# Patient Record
Sex: MALE | Race: OTHER | NOT HISPANIC OR LATINO | URBAN - METROPOLITAN AREA
[De-identification: names, ages, dates, MRNs, and addresses within clinical notes are randomized per-mention and may not be internally consistent; named-entity substitution may affect disease eponyms.]

---

## 2019-04-03 ENCOUNTER — OFFICE VISIT (OUTPATIENT)
Dept: PHYSICAL THERAPY | Facility: CLINIC | Age: 64
End: 2019-04-03
Payer: MEDICARE

## 2019-04-03 DIAGNOSIS — M48.061 SPINAL STENOSIS OF LUMBAR REGION WITHOUT NEUROGENIC CLAUDICATION: Primary | ICD-10-CM

## 2019-04-03 PROCEDURE — 97110 THERAPEUTIC EXERCISES: CPT | Performed by: PHYSICAL THERAPIST

## 2019-04-03 PROCEDURE — 97112 NEUROMUSCULAR REEDUCATION: CPT | Performed by: PHYSICAL THERAPIST

## 2019-04-04 ENCOUNTER — OFFICE VISIT (OUTPATIENT)
Dept: PHYSICAL THERAPY | Facility: CLINIC | Age: 64
End: 2019-04-04
Payer: MEDICARE

## 2019-04-04 DIAGNOSIS — M48.061 SPINAL STENOSIS OF LUMBAR REGION WITHOUT NEUROGENIC CLAUDICATION: Primary | ICD-10-CM

## 2019-04-04 PROCEDURE — 97110 THERAPEUTIC EXERCISES: CPT | Performed by: PHYSICAL THERAPIST

## 2019-04-10 ENCOUNTER — TRANSCRIBE ORDERS (OUTPATIENT)
Dept: PHYSICAL THERAPY | Facility: CLINIC | Age: 64
End: 2019-04-10

## 2019-04-10 ENCOUNTER — OFFICE VISIT (OUTPATIENT)
Dept: PHYSICAL THERAPY | Facility: CLINIC | Age: 64
End: 2019-04-10
Payer: MEDICARE

## 2019-04-10 DIAGNOSIS — M48.061 SPINAL STENOSIS OF LUMBAR REGION WITHOUT NEUROGENIC CLAUDICATION: Primary | ICD-10-CM

## 2019-04-10 PROCEDURE — 97112 NEUROMUSCULAR REEDUCATION: CPT | Performed by: PHYSICAL THERAPIST

## 2019-04-10 PROCEDURE — 97110 THERAPEUTIC EXERCISES: CPT | Performed by: PHYSICAL THERAPIST

## 2019-04-11 ENCOUNTER — OFFICE VISIT (OUTPATIENT)
Dept: PHYSICAL THERAPY | Facility: CLINIC | Age: 64
End: 2019-04-11
Payer: MEDICARE

## 2019-04-11 DIAGNOSIS — M48.061 SPINAL STENOSIS OF LUMBAR REGION WITHOUT NEUROGENIC CLAUDICATION: Primary | ICD-10-CM

## 2019-04-11 PROCEDURE — 97112 NEUROMUSCULAR REEDUCATION: CPT

## 2019-04-11 PROCEDURE — 97110 THERAPEUTIC EXERCISES: CPT

## 2019-04-16 ENCOUNTER — APPOINTMENT (OUTPATIENT)
Dept: PHYSICAL THERAPY | Facility: CLINIC | Age: 64
End: 2019-04-16
Payer: MEDICARE

## 2019-04-23 ENCOUNTER — APPOINTMENT (OUTPATIENT)
Dept: PHYSICAL THERAPY | Facility: CLINIC | Age: 64
End: 2019-04-23
Payer: MEDICARE

## 2019-04-25 ENCOUNTER — APPOINTMENT (OUTPATIENT)
Dept: PHYSICAL THERAPY | Facility: CLINIC | Age: 64
End: 2019-04-25
Payer: MEDICARE

## 2019-05-07 ENCOUNTER — OFFICE VISIT (OUTPATIENT)
Dept: PHYSICAL THERAPY | Facility: CLINIC | Age: 64
End: 2019-05-07
Payer: MEDICARE

## 2019-05-07 DIAGNOSIS — M48.061 SPINAL STENOSIS OF LUMBAR REGION WITHOUT NEUROGENIC CLAUDICATION: Primary | ICD-10-CM

## 2019-05-07 PROCEDURE — 97112 NEUROMUSCULAR REEDUCATION: CPT | Performed by: PHYSICAL THERAPIST

## 2019-05-07 PROCEDURE — 97110 THERAPEUTIC EXERCISES: CPT | Performed by: PHYSICAL THERAPIST

## 2019-05-09 ENCOUNTER — OFFICE VISIT (OUTPATIENT)
Dept: PHYSICAL THERAPY | Facility: CLINIC | Age: 64
End: 2019-05-09
Payer: MEDICARE

## 2019-05-09 DIAGNOSIS — M48.061 SPINAL STENOSIS OF LUMBAR REGION WITHOUT NEUROGENIC CLAUDICATION: Primary | ICD-10-CM

## 2019-05-09 PROCEDURE — 97110 THERAPEUTIC EXERCISES: CPT

## 2019-05-14 ENCOUNTER — APPOINTMENT (OUTPATIENT)
Dept: PHYSICAL THERAPY | Facility: CLINIC | Age: 64
End: 2019-05-14
Payer: MEDICARE

## 2019-05-16 ENCOUNTER — APPOINTMENT (OUTPATIENT)
Dept: PHYSICAL THERAPY | Facility: CLINIC | Age: 64
End: 2019-05-16
Payer: MEDICARE

## 2019-09-26 ENCOUNTER — LAB REQUISITION (OUTPATIENT)
Dept: LAB | Facility: HOSPITAL | Age: 64
End: 2019-09-26
Payer: MEDICARE

## 2019-09-26 DIAGNOSIS — E78.2 MIXED HYPERLIPIDEMIA: ICD-10-CM

## 2019-09-26 LAB
ALBUMIN SERPL BCP-MCNC: 3.7 G/DL (ref 3.5–5)
ALP SERPL-CCNC: 90 U/L (ref 46–116)
ALT SERPL W P-5'-P-CCNC: 12 U/L (ref 12–78)
ANION GAP SERPL CALCULATED.3IONS-SCNC: 9 MMOL/L (ref 4–13)
AST SERPL W P-5'-P-CCNC: 12 U/L (ref 5–45)
BASOPHILS # BLD AUTO: 0.02 THOUSANDS/ΜL (ref 0–0.1)
BASOPHILS NFR BLD AUTO: 1 % (ref 0–1)
BILIRUB SERPL-MCNC: 1 MG/DL (ref 0.2–1)
BUN SERPL-MCNC: 14 MG/DL (ref 5–25)
CALCIUM SERPL-MCNC: 9.2 MG/DL (ref 8.3–10.1)
CHLORIDE SERPL-SCNC: 103 MMOL/L (ref 100–108)
CHOLEST SERPL-MCNC: 177 MG/DL (ref 50–200)
CO2 SERPL-SCNC: 29 MMOL/L (ref 21–32)
CREAT SERPL-MCNC: 1.23 MG/DL (ref 0.6–1.3)
EOSINOPHIL # BLD AUTO: 0.27 THOUSAND/ΜL (ref 0–0.61)
EOSINOPHIL NFR BLD AUTO: 10 % (ref 0–6)
ERYTHROCYTE [DISTWIDTH] IN BLOOD BY AUTOMATED COUNT: 14.5 % (ref 11.6–15.1)
GFR SERPL CREATININE-BSD FRML MDRD: 62 ML/MIN/1.73SQ M
GLUCOSE P FAST SERPL-MCNC: 85 MG/DL (ref 65–99)
HCT VFR BLD AUTO: 34.6 % (ref 36.5–49.3)
HDLC SERPL-MCNC: 66 MG/DL (ref 40–60)
HGB BLD-MCNC: 11.4 G/DL (ref 12–17)
IMM GRANULOCYTES # BLD AUTO: 0.01 THOUSAND/UL (ref 0–0.2)
IMM GRANULOCYTES NFR BLD AUTO: 0 % (ref 0–2)
LDLC SERPL CALC-MCNC: 102 MG/DL (ref 0–100)
LYMPHOCYTES # BLD AUTO: 0.33 THOUSANDS/ΜL (ref 0.6–4.47)
LYMPHOCYTES NFR BLD AUTO: 13 % (ref 14–44)
MCH RBC QN AUTO: 30.2 PG (ref 26.8–34.3)
MCHC RBC AUTO-ENTMCNC: 32.9 G/DL (ref 31.4–37.4)
MCV RBC AUTO: 92 FL (ref 82–98)
MONOCYTES # BLD AUTO: 0.49 THOUSAND/ΜL (ref 0.17–1.22)
MONOCYTES NFR BLD AUTO: 19 % (ref 4–12)
NEUTROPHILS # BLD AUTO: 1.51 THOUSANDS/ΜL (ref 1.85–7.62)
NEUTS SEG NFR BLD AUTO: 57 % (ref 43–75)
NONHDLC SERPL-MCNC: 111 MG/DL
NRBC BLD AUTO-RTO: 0 /100 WBCS
PLATELET # BLD AUTO: 166 THOUSANDS/UL (ref 149–390)
PMV BLD AUTO: 10.1 FL (ref 8.9–12.7)
POTASSIUM SERPL-SCNC: 4.1 MMOL/L (ref 3.5–5.3)
PROT SERPL-MCNC: 7.6 G/DL (ref 6.4–8.2)
RBC # BLD AUTO: 3.78 MILLION/UL (ref 3.88–5.62)
SODIUM SERPL-SCNC: 141 MMOL/L (ref 136–145)
TRIGL SERPL-MCNC: 47 MG/DL
WBC # BLD AUTO: 2.63 THOUSAND/UL (ref 4.31–10.16)

## 2019-09-26 PROCEDURE — 80053 COMPREHEN METABOLIC PANEL: CPT | Performed by: INTERNAL MEDICINE

## 2019-09-26 PROCEDURE — 80061 LIPID PANEL: CPT | Performed by: INTERNAL MEDICINE

## 2019-09-26 PROCEDURE — 85025 COMPLETE CBC W/AUTO DIFF WBC: CPT | Performed by: INTERNAL MEDICINE

## 2019-10-03 ENCOUNTER — LAB REQUISITION (OUTPATIENT)
Dept: LAB | Facility: HOSPITAL | Age: 64
End: 2019-10-03
Payer: MEDICARE

## 2019-10-03 DIAGNOSIS — E78.2 MIXED HYPERLIPIDEMIA: ICD-10-CM

## 2019-10-03 LAB
BACTERIA UR QL AUTO: ABNORMAL /HPF
BILIRUB UR QL STRIP: NEGATIVE
CLARITY UR: CLEAR
COLOR UR: YELLOW
GLUCOSE UR STRIP-MCNC: NEGATIVE MG/DL
HGB UR QL STRIP.AUTO: ABNORMAL
KETONES UR STRIP-MCNC: NEGATIVE MG/DL
LEUKOCYTE ESTERASE UR QL STRIP: NEGATIVE
MUCOUS THREADS UR QL AUTO: ABNORMAL
NITRITE UR QL STRIP: NEGATIVE
NON-SQ EPI CELLS URNS QL MICRO: ABNORMAL /HPF
PH UR STRIP.AUTO: 5.5 [PH]
PROT UR STRIP-MCNC: NEGATIVE MG/DL
RBC #/AREA URNS AUTO: ABNORMAL /HPF
SP GR UR STRIP.AUTO: 1.02 (ref 1–1.03)
UROBILINOGEN UR QL STRIP.AUTO: 0.2 E.U./DL
WBC #/AREA URNS AUTO: ABNORMAL /HPF

## 2019-10-03 PROCEDURE — 81001 URINALYSIS AUTO W/SCOPE: CPT | Performed by: INTERNAL MEDICINE

## 2020-01-02 ENCOUNTER — TRANSCRIBE ORDERS (OUTPATIENT)
Dept: PHYSICAL THERAPY | Facility: CLINIC | Age: 65
End: 2020-01-02

## 2020-01-02 ENCOUNTER — EVALUATION (OUTPATIENT)
Dept: PHYSICAL THERAPY | Facility: CLINIC | Age: 65
End: 2020-01-02
Payer: MEDICARE

## 2020-01-02 DIAGNOSIS — I89.0 LYMPHEDEMA: Primary | ICD-10-CM

## 2020-01-02 PROCEDURE — 97140 MANUAL THERAPY 1/> REGIONS: CPT | Performed by: PHYSICAL THERAPIST

## 2020-01-02 PROCEDURE — 97162 PT EVAL MOD COMPLEX 30 MIN: CPT | Performed by: PHYSICAL THERAPIST

## 2020-01-02 NOTE — PROGRESS NOTES
PT Evaluation     Today's date: 2020  Patient name: Morteza Rebolledo  : 1955  MRN: [id number]  Referring provider: Naseem Corrales MD  Dx:   Encounter Diagnosis     ICD-10-CM    1  Lymphedema I89 0                   Assessment  Assessment details: Alida Brewer reports to PT with b/l LE lymphedema and generalized weakness  Results of eval indicate LE weakness greater on L side, impaired gait, limited ankle and knee ROM related to edema, positive Stemmer's sign b/l, greater edema on LLE  Pt presents as stage II lymphedema  These impairments are resulting in difficulty with household ambulation, transfers, and negotiating stairs  He would benefit from skilled PT to address these deficits  He would also benefit from compression stockings above knees  Was educated on continuation of lymphemia pump and home exercises  Barriers to therapy: Complex medical hx, CVA  Understanding of Dx/Px/POC: good   Prognosis: fair    Goals  STGs (4 weeks)  Pt will be independent with comprehensive HEP   Decrease edema by 10%  Pt will be independent with garments and lymphedema pump    LTG's (to be achieved by d/c)  Pt will be able to self manage sx's independently   Decrease edema by 25%  Decrease girth measurement by 1-2 cm   Pt will be able to ambulate at least 50ft safely with least restrictive AD, not holding onto furniture   Pt will be able to transfer from chair with safe technique and single or no UE support      Plan  Plan details: Initiate POC   Splite session time 50/50 for lymphedema treatment and strengthening  Patient would benefit from: skilled physical therapy  Planned therapy interventions: manual therapy, balance, home exercise program, gait training, therapeutic exercise, therapeutic activities, strengthening, patient education and neuromuscular re-education  Frequency: 2x week  Duration in weeks: 8  Plan of Care beginning date: 2020  Plan of Care expiration date: 3/10/2020  Treatment plan discussed with: patient        Subjective Evaluation    History of Present Illness  Mechanism of injury: Corrina Burgess reports to PT with b/l lymphedema (worse on L) and general weakness  Has had lymphedema for a few years now on and off which started on his left LE then to his R  Has been getting weaker because of frequent surgical history and medical issues related to hernia and prostate cancer  Caner in remission now  Still has issues with hernia on L side  Uses compression stockings and a lymphadema pump (states these have been helping less and less over time)  States his legs swell badly after a few hours of being up  Has hx of CVA which caused L sided weakness  Difficulty with walking, states he can only go a few feet before needing to rest, holds onto walls and furniture  Uses a SPC  Difficulty with mobility around his home and in community  Difficulty with stairs  Goals: improve strength, manage lymphadema  Med hx: hernia, HTN, skin burning from hx of shingles, obesity, CVA, hx of cancer    Medications: pt remembers he is taking medication for HTN, gabapentin, oxycotin  Pain  No pain reported          Objective    ROM:  R Knee flex: 115  L knee flex: 105    R ankle DF: 4  L ankle DF: 2    Strength:   Hip flex R: 4  Hip flex L: 5  Hip abd seated: 3+ b/l  Knee ext R: 5  Knee flex L: 3  Ankle DF R: 4  Ankle DF L: 3     Girth measurements: see sheet for details    Stemmer's sign: positive    Lymph stage: stage 2     *Pitting edema present through out b/l LE's  Transfers: needs b/l UE's to rise from chair with difficulty, demonstrates poor safety due to instability    Gait Analysis: Demonstrates wide Odalis, instability, reaches for furniture          Precautions: Prostate cancer, Hx of CVA, hx of cancer,        Manual  1/2            Girth measurements  Taken by PTA under PT interpretation                                                                     Exercise Diary              Bike              Heel / toe raises              Mini squats in bars             Step ups             Static balance              LE circuit              Sit to stands                           Lymphedema treatment                                                                                                                                                                Modalities

## 2020-01-02 NOTE — LETTER
2020    Ajit Sarabia MD  [address] [place] [address]    Patient: Jalil Quiroga   YOB: 1955   Date of Visit: 2020     Encounter Diagnosis     ICD-10-CM    1  Lymphedema I89 0        Dear Dr Rivera Clarity: Thank you for your recent referral of Jalil Quiroga  Please review the attached evaluation summary from Usman's recent visit  Please verify that you agree with the plan of care by signing the attached order  If you have any questions or concerns, please do not hesitate to call  I sincerely appreciate the opportunity to share in the care of one of your patients and hope to have another opportunity to work with you in the near future  Sincerely,    Ne Morales, PT      Referring Provider:      I certify that I have read the below Plan of Care and certify the need for these services furnished under this plan of treatment while under my care  Ajit Sarabia MD  [address] [place] [address]: [phone]          PT Evaluation     Today's date: 2020  Patient name: Jalil Quiroga  : 1955  MRN: [id number]  Referring provider: Nya Garcia MD  Dx:   Encounter Diagnosis     ICD-10-CM    1  Lymphedema I89 0                   Assessment  Assessment details: Helen Ruggiero reports to PT with b/l LE lymphedema and generalized weakness  Results of eval indicate LE weakness greater on L side, impaired gait, limited ankle and knee ROM related to edema, positive Stemmer's sign b/l, greater edema on LLE  Pt presents as stage II lymphedema  These impairments are resulting in difficulty with household ambulation, transfers, and negotiating stairs  He would benefit from skilled PT to address these deficits  He would also benefit from compression stockings above knees  Was educated on continuation of lymphemia pump and home exercises               Barriers to therapy: Complex medical hx, CVA  Understanding of Dx/Px/POC: good   Prognosis: fair    Goals  STGs (4 weeks)  Pt will be independent with comprehensive HEP   Decrease edema by 10%  Pt will be independent with garments and lymphedema pump    LTG's (to be achieved by d/c)  Pt will be able to self manage sx's independently   Decrease edema by 25%  Decrease girth measurement by 1-2 cm   Pt will be able to ambulate at least 50ft safely with least restrictive AD, not holding onto furniture   Pt will be able to transfer from chair with safe technique and single or no UE support      Plan  Plan details: Initiate POC  Splite session time 50/50 for lymphedema treatment and strengthening  Patient would benefit from: skilled physical therapy  Planned therapy interventions: manual therapy, balance, home exercise program, gait training, therapeutic exercise, therapeutic activities, strengthening, patient education and neuromuscular re-education  Frequency: 2x week  Duration in weeks: 8  Plan of Care beginning date: 1/2/2020  Plan of Care expiration date: 3/10/2020  Treatment plan discussed with: patient        Subjective Evaluation    History of Present Illness  Mechanism of injury: Deirdre Lockhart reports to PT with b/l lymphedema (worse on L) and general weakness  Has had lymphedema for a few years now on and off which started on his left LE then to his R  Has been getting weaker because of frequent surgical history and medical issues related to hernia and prostate cancer  Caner in remission now  Still has issues with hernia on L side  Uses compression stockings and a lymphadema pump (states these have been helping less and less over time)  States his legs swell badly after a few hours of being up  Has hx of CVA which caused L sided weakness  Difficulty with walking, states he can only go a few feet before needing to rest, holds onto walls and furniture  Uses a SPC  Difficulty with mobility around his home and in community  Difficulty with stairs      Goals: improve strength, manage lymphadema  Med hx: hernia, HTN, skin burning from hx of shingles, obesity, CVA, hx of cancer    Medications: pt remembers he is taking medication for HTN, gabapentin, oxycotin  Pain  No pain reported          Objective    ROM:  R Knee flex: 115  L knee flex: 105    R ankle DF: 4  L ankle DF: 2    Strength:   Hip flex R: 4  Hip flex L: 5  Hip abd seated: 3+ b/l  Knee ext R: 5  Knee flex L: 3  Ankle DF R: 4  Ankle DF L: 3     Girth measurements: see sheet for details    Stemmer's sign: positive    Lymph stage: stage 2     *Pitting edema present through out b/l LE's  Transfers: needs b/l UE's to rise from chair with difficulty, demonstrates poor safety due to instability    Gait Analysis: Demonstrates wide Odalis, instability, reaches for furniture          Precautions: Prostate cancer, Hx of CVA, hx of cancer,        Manual                                                                                   Exercise Diary              Bike              Heel / toe raises              Mini squats in bars             Step ups             Static balance              LE circuit              Sit to stands                           Lymphedema treatment                                                                                                                                                                Modalities

## 2020-01-02 NOTE — LETTER
2020    Bailey Weiss MD  [address] [place] [address]    Patient: Maurizio Mclaughlin   YOB: 1955   Date of Visit: 2020     Encounter Diagnosis     ICD-10-CM    1  Lymphedema I89 0        Dear Dr Dariel Klinefelter: Thank you for your recent referral of Maurizio Mclaughlin  Please review the attached evaluation summary from Usman's recent visit  Please verify that you agree with the plan of care by signing the attached order  If you have any questions or concerns, please do not hesitate to call  I sincerely appreciate the opportunity to share in the care of one of your patients and hope to have another opportunity to work with you in the near future  Sincerely,    Meli Duval, PT      Referring Provider:      I certify that I have read the below Plan of Care and certify the need for these services furnished under this plan of treatment while under my care  Bailey Weiss MD  [address] [place] [address]: [phone]          PT Evaluation     Today's date: 2020  Patient name: Maurizio Mclaughlin  : 1955  MRN: [id number]  Referring provider: Mckenna Blackmon MD  Dx:   Encounter Diagnosis     ICD-10-CM    1  Lymphedema I89 0                   Assessment  Assessment details: Natasha Collins reports to PT with b/l LE lymphedema and generalized weakness  Results of eval indicate LE weakness greater on L side, impaired gait, limited ankle and knee ROM related to edema, positive Stemmer's sign b/l, greater edema on LLE  Pt presents as stage II lymphedema  These impairments are resulting in difficulty with household ambulation, transfers, and negotiating stairs  He would benefit from skilled PT to address these deficits  He would also benefit from compression stockings above knees  Was educated on continuation of lymphemia pump and home exercises               Barriers to therapy: Complex medical hx, CVA  Understanding of Dx/Px/POC: good   Prognosis: fair    Goals  STGs (4 weeks)  Pt will be independent with comprehensive HEP   Decrease edema by 10%  Pt will be independent with garments and lymphedema pump    LTG's (to be achieved by d/c)  Pt will be able to self manage sx's independently   Decrease edema by 25%  Decrease girth measurement by 1-2 cm   Pt will be able to ambulate at least 50ft safely with least restrictive AD, not holding onto furniture   Pt will be able to transfer from chair with safe technique and single or no UE support      Plan  Plan details: Initiate POC  Splite session time 50/50 for lymphedema treatment and strengthening  Patient would benefit from: skilled physical therapy  Planned therapy interventions: manual therapy, balance, home exercise program, gait training, therapeutic exercise, therapeutic activities, strengthening, patient education and neuromuscular re-education  Frequency: 2x week  Duration in weeks: 8  Plan of Care beginning date: 1/2/2020  Plan of Care expiration date: 3/10/2020  Treatment plan discussed with: patient        Subjective Evaluation    History of Present Illness  Mechanism of injury: Vishnu Ricks reports to PT with b/l lymphedema (worse on L) and general weakness  Has had lymphedema for a few years now on and off which started on his left LE then to his R  Has been getting weaker because of frequent surgical history and medical issues related to hernia and prostate cancer  Caner in remission now  Still has issues with hernia on L side  Uses compression stockings and a lymphadema pump (states these have been helping less and less over time)  States his legs swell badly after a few hours of being up  Has hx of CVA which caused L sided weakness  Difficulty with walking, states he can only go a few feet before needing to rest, holds onto walls and furniture  Uses a SPC  Difficulty with mobility around his home and in community  Difficulty with stairs      Goals: improve strength, manage lymphadema  Med hx: hernia, HTN, skin burning from hx of shingles, obesity, CVA, hx of cancer    Medications: pt remembers he is taking medication for HTN, gabapentin, oxycotin  Pain  No pain reported          Objective    ROM:  R Knee flex: 115  L knee flex: 105    R ankle DF: 4  L ankle DF: 2    Strength:   Hip flex R: 4  Hip flex L: 5  Hip abd seated: 3+ b/l  Knee ext R: 5  Knee flex L: 3  Ankle DF R: 4  Ankle DF L: 3     Girth measurements: see sheet for details    Stemmer's sign: positive    Lymph stage: stage 2     *Pitting edema present through out b/l LE's  Transfers: needs b/l UE's to rise from chair with difficulty, demonstrates poor safety due to instability    Gait Analysis: Demonstrates wide Odalis, instability, reaches for furniture          Precautions: Prostate cancer, Hx of CVA, hx of cancer,        Manual  1/2            Girth measurements  Taken by PTA under PT interpretation                                                                     Exercise Diary              Bike              Heel / toe raises              Mini squats in bars             Step ups             Static balance              LE circuit              Sit to stands                           Lymphedema treatment                                                                                                                                                                Modalities

## 2020-01-09 ENCOUNTER — OFFICE VISIT (OUTPATIENT)
Dept: PHYSICAL THERAPY | Facility: CLINIC | Age: 65
End: 2020-01-09
Payer: MEDICARE

## 2020-01-09 DIAGNOSIS — I89.0 LYMPHEDEMA: Primary | ICD-10-CM

## 2020-01-09 PROCEDURE — 97140 MANUAL THERAPY 1/> REGIONS: CPT

## 2020-01-09 PROCEDURE — 97110 THERAPEUTIC EXERCISES: CPT

## 2020-01-09 NOTE — PROGRESS NOTES
Daily Note     Today's date: 2020  Patient name: Chandler León  : 1955  MRN: [id number]  Referring provider: Sawyer Virk MD  Dx:   Encounter Diagnosis     ICD-10-CM    1  Lymphedema I89 0        Start Time: 1200  Stop Time: 1255  Total time in clinic (min): 55 minutes    Subjective: Patient stated that he is not seeing a lot of difference with using compression pump  Stated he feels stiff in the thigh today  Feeling weak  Stated he is feeling depressed due to his medical conditions  Objective: See treatment diary below      Assessment: Patient unable to tolerate MLD in stomach due to hernia  MLD to L LE to decrease swelling and improve lymph flow  Patient stated he felt better in thigh post manual  Rec  Bike to improve flexibility and lymph flow  Advised patient to discuss with PCP about seeing a psychiatrist for depression  Patient tolerated exercises well  Plan: Continue per plan of care        Precautions: Prostate cancer, Hx of CVA, hx of cancer,        Manual  1/2            Girth measurements  Taken by PTA under PT interpretation             MLD  27'                                                      Exercise Diary              Bike  10'            Heel / toe raises              Mini squats in bars             Step ups             Static balance              LE circuit  UE 20x            Sit to stands                           Lymphedema treatment                                                                                                                                                                Modalities

## 2020-01-10 ENCOUNTER — OFFICE VISIT (OUTPATIENT)
Dept: PHYSICAL THERAPY | Facility: CLINIC | Age: 65
End: 2020-01-10
Payer: MEDICARE

## 2020-01-10 DIAGNOSIS — I89.0 LYMPHEDEMA: Primary | ICD-10-CM

## 2020-01-10 PROCEDURE — 97110 THERAPEUTIC EXERCISES: CPT

## 2020-01-10 NOTE — PROGRESS NOTES
Daily Note     Today's date: 1/10/2020  Patient name: Juan Barbosa  : 1955  MRN: [id number]  Referring provider: Yamil Tristan MD  Dx:   Encounter Diagnosis     ICD-10-CM    1  Lymphedema I89 0        Start Time: 1100  Stop Time: 1150  Total time in clinic (min): 50 minutes    Subjective: Patient stated he is feeling a little weak and shaky today  Objective: See treatment diary below      Assessment: Patient tolerated exercises well  Required some rest breaks  Patient ambulated into clinic with scissor gait and required support from [facility] and [place]  Cueing for posture  Cueing for patient to not over exert on the machines  Patient felt good post session  Plan: Continue per plan of care        Precautions: Prostate cancer, Hx of CVA, hx of cancer,        Manual             Girth measurements  Taken by PTA under PT interpretation             MLD  30'                                                      Exercise Diary  1/9 1/10           Bike  10' 10'           Heel / toe raises              Mini squats in bars             Step ups             Static balance              LE circuit  UE 20x Full 20x           Sit to stands                           Lymphedema treatment                                                                                                                                                                Modalities

## 2020-01-14 ENCOUNTER — OFFICE VISIT (OUTPATIENT)
Dept: PHYSICAL THERAPY | Facility: CLINIC | Age: 65
End: 2020-01-14
Payer: MEDICARE

## 2020-01-14 DIAGNOSIS — I89.0 LYMPHEDEMA: Primary | ICD-10-CM

## 2020-01-14 PROCEDURE — 97110 THERAPEUTIC EXERCISES: CPT

## 2020-01-14 PROCEDURE — 97530 THERAPEUTIC ACTIVITIES: CPT

## 2020-01-14 PROCEDURE — 97112 NEUROMUSCULAR REEDUCATION: CPT

## 2020-01-14 NOTE — PROGRESS NOTES
Daily Note     Today's date: 2020  Patient name: Isrrael Pozo  : 1955  MRN: [id number]  Referring provider: Leni Tony MD  Dx:   Encounter Diagnosis     ICD-10-CM    1  Lymphedema I89 0        Start Time: 1200  Stop Time: 5246  Total time in clinic (min): 55 minutes    Subjective: Patient reports not sleeping well again last night  No c/o pain  Stated he saw the doctor about his legs  Nothing is obstructing the veins  Did suggest thigh high stockings  Objective: See treatment diary below      Assessment: Patient tolerated exercises well  Patient became fatigued with step-ups  No LOB on airex balance  Showed good ankle strategy movements  Patient demonstrated good body mechanics with sit-stand transfers  Able to stand w/o use of hands  Plan: Continue per plan of care        Precautions: Prostate cancer, Hx of CVA, hx of cancer,        Manual             Girth measurements  Taken by PTA under PT interpretation             MLD  30'                                                      Exercise Diary  1/9 1/10 1/14          Bike  10' 10' 10'          Heel / toe raises    20x          Mini squats in bars             Step ups   6" x10          Static balance    5'          LE circuit  UE 20x Full 20x Full 20x          Sit to stands    2x5                       Lymphedema treatment                                                                                                                                                                Modalities

## 2020-01-17 ENCOUNTER — OFFICE VISIT (OUTPATIENT)
Dept: PHYSICAL THERAPY | Facility: CLINIC | Age: 65
End: 2020-01-17
Payer: MEDICARE

## 2020-01-17 DIAGNOSIS — I89.0 LYMPHEDEMA: Primary | ICD-10-CM

## 2020-01-17 PROCEDURE — 97530 THERAPEUTIC ACTIVITIES: CPT

## 2020-01-17 PROCEDURE — 97110 THERAPEUTIC EXERCISES: CPT

## 2020-01-17 PROCEDURE — 97112 NEUROMUSCULAR REEDUCATION: CPT

## 2020-01-17 NOTE — PROGRESS NOTES
Daily Note     Today's date: 2020  Patient name: Karan Hudson  : 1955  MRN: [id number]  Referring provider: Leni Wilkinson MD  Dx:   Encounter Diagnosis     ICD-10-CM    1  Lymphedema I89 0        Start Time: 1055  Stop Time: 1150  Total time in clinic (min): 55 minutes    Subjective: Patient reports feeling pretty good  Continued difficulty sleeping  Objective: See treatment diary below      Assessment: Patient tolerated exercises well  Ordered patient more thigh compression garments  Patient was steady on airex w/ EO but was more unsteady w/ EC and required stand by assist         Plan: Continue per plan of care        Precautions: Prostate cancer, Hx of CVA, hx of cancer,        Manual             Girth measurements  Taken by PTA under PT interpretation             MLD  30'                                                      Exercise Diary  1/9 1/10 1/14 1/17         Bike  10' 10' 10' 10'         Heel / toe raises    20x          Mini squats in bars             Step ups   6" x10 6" 2x10         Static balance    5' 5' EO/EC         LE circuit  UE 20x Full 20x Full 20x Full 30x         Sit to stands    2x5                       Lymphedema treatment                          Garment ordering    15'--TA                                                                                                                                  Modalities

## 2020-01-21 ENCOUNTER — APPOINTMENT (OUTPATIENT)
Dept: PHYSICAL THERAPY | Facility: CLINIC | Age: 65
End: 2020-01-21
Payer: MEDICARE

## 2020-01-24 ENCOUNTER — OFFICE VISIT (OUTPATIENT)
Dept: PHYSICAL THERAPY | Facility: CLINIC | Age: 65
End: 2020-01-24
Payer: MEDICARE

## 2020-01-24 DIAGNOSIS — I89.0 LYMPHEDEMA: Primary | ICD-10-CM

## 2020-01-24 PROCEDURE — 97110 THERAPEUTIC EXERCISES: CPT

## 2020-01-24 PROCEDURE — 97112 NEUROMUSCULAR REEDUCATION: CPT

## 2020-01-24 NOTE — PROGRESS NOTES
Daily Note     Today's date: 2020  Patient name: Raghav Call  : 1955  MRN: [id number]  Referring provider: Victor Hugo Delarosa MD  Dx:   Encounter Diagnosis     ICD-10-CM    1  Lymphedema I89 0        Start Time: 1000  Stop Time: 1100  Total time in clinic (min): 60 minutes    Subjective: Patient reports no pain  Able to get some sleep last night due to increase in gabapentin medication  Objective: See treatment diary below      Assessment: Patient tolerated exercises well  Improved FOTO score  Good endurance today  Improve balance on airex  Standby assist required for EC on airex  Plan: Continue per plan of care        Precautions: Prostate cancer, Hx of CVA, hx of cancer,        Manual             Girth measurements  Taken by PTA under PT interpretation             MLD  30'                                                      Exercise Diary  1/9 1/10 1/14 1/17 1/24        Bike  10' 10' 10' 10' 10'        Heel / toe raises    20x          Mini squats in bars             Step ups   6" x10 6" 2x10 6" 20x        Static balance    5' 5' EO/EC 5'        LE circuit  UE 20x Full 20x Full 20x Full 30x Full 30x        Sit to stands    2x5                       Lymphedema treatment                          Garment ordering    15'--TA                                                                                                                                  Modalities

## 2020-01-27 ENCOUNTER — OFFICE VISIT (OUTPATIENT)
Dept: PHYSICAL THERAPY | Facility: CLINIC | Age: 65
End: 2020-01-27
Payer: MEDICARE

## 2020-01-27 DIAGNOSIS — I89.0 LYMPHEDEMA: Primary | ICD-10-CM

## 2020-01-27 PROCEDURE — 97110 THERAPEUTIC EXERCISES: CPT

## 2020-01-27 NOTE — PROGRESS NOTES
Daily Note     Today's date: 2020  Patient name: Gloria More  : 1955  MRN: [id number]  Referring provider: Moi Garcia MD  Dx:   Encounter Diagnosis     ICD-10-CM    1  Lymphedema I89 0        Start Time: 1200  Stop Time: 1300  Total time in clinic (min): 60 minutes    Subjective: Patient reports feeling pretty good today  Compression garments still has not arrived  Objective: See treatment diary below      Assessment: Patient tolerated exercises well  Tolerated increased time on rec bike  Good endurance  No increased pain  Patient was fatigued post session but felt good  Plan: Continue per plan of care        Precautions: Prostate cancer, Hx of CVA, hx of cancer,        Manual             Girth measurements  Taken by PTA under PT interpretation             MLD  30'                                                      Exercise Diary  1/9 1/10 1/14 1/17 1/24 1/27       Bike  10' 10' 10' 10' 10' 15'       Heel / toe raises    20x          Mini squats in bars             Step ups   6" x10 6" 2x10 6" 20x 6" 20x       Static balance    5' 5' EO/EC 5'        LE circuit  UE 20x Full 20x Full 20x Full 30x Full 30x Full 30x       Sit to stands    2x5                       Lymphedema treatment                          Garment ordering    15'--TA                                                                                                                                  Modalities

## 2020-01-29 ENCOUNTER — OFFICE VISIT (OUTPATIENT)
Dept: PHYSICAL THERAPY | Facility: CLINIC | Age: 65
End: 2020-01-29
Payer: MEDICARE

## 2020-01-29 DIAGNOSIS — I89.0 LYMPHEDEMA: Primary | ICD-10-CM

## 2020-01-29 PROCEDURE — 97110 THERAPEUTIC EXERCISES: CPT

## 2020-01-29 PROCEDURE — 97530 THERAPEUTIC ACTIVITIES: CPT

## 2020-01-29 NOTE — PROGRESS NOTES
Daily Note     Today's date: 2020  Patient name: Coleen Albrecht  : 1955  MRN: [id number]  Referring provider: Yordy Serna MD  Dx:   Encounter Diagnosis     ICD-10-CM    1  Lymphedema I89 0        Start Time: 1100  Stop Time: 1200  Total time in clinic (min): 60 minutes    Subjective: Patient stated he is feeling good today  New garments arrived  Objective: See treatment diary below      Assessment: Put new compression garments on patient BL LE  Instructed patient to wear garments for about 8 hours  Told patient garments may be easier to don/doff with legs less swollen  Tolerated exercises well  Good endurance  Told patient it may take time to get used to feeling of new thigh high garments  Plan: Continue per plan of care        Precautions: Prostate cancer, Hx of CVA, hx of cancer,        Manual             Girth measurements  Taken by PTA under PT interpretation             MLD  30'                                                      Exercise Diary  1/9 1/10 1/14 1/17 1/24 1/27 1/29      Bike  10' 10' 10' 10' 10' 15' 10'      Heel / toe raises    20x          Mini squats in bars             Step ups   6" x10 6" 2x10 6" 20x 6" 20x       Static balance    5' 5' EO/EC 5'        LE circuit  UE 20x Full 20x Full 20x Full 30x Full 30x Full 30x 30x      Sit to stands    2x5                       Lymphedema treatment                          Garment ordering    15'--TA   Garment donning 20'--TA                                                                                                                               Modalities

## 2020-02-04 ENCOUNTER — OFFICE VISIT (OUTPATIENT)
Dept: PHYSICAL THERAPY | Facility: CLINIC | Age: 65
End: 2020-02-04

## 2020-02-04 DIAGNOSIS — I89.0 LYMPHEDEMA: Primary | ICD-10-CM

## 2020-02-04 NOTE — PROGRESS NOTES
Daily Note     Today's date: 2020  Patient name: Azar Perez  : 1955  MRN: [id number]  Referring provider: Montana English MD  Dx:   Encounter Diagnosis     ICD-10-CM    1  Lymphedema I89 0        Start Time: 1100  Stop Time: 1130  Total time in clinic (min): 30 minutes    Subjective: patient reports not feeling right, took medications on empty stomach, stomach feels bloated      Objective: See treatment diary below      Assessment: physical therapy deferred secondary to patient not feeling right, /75, patient advised to go home and eat, contact physician if symptoms persist      Plan: Continue per plan of care        Precautions: Prostate cancer, Hx of CVA, hx of cancer,        Manual             Girth measurements  Taken by PTA under PT interpretation             MLD  30'                                                      Exercise Diary  1/9 1/10 1/14 1/17 1/24 1/27 1/29      Bike  10' 10' 10' 10' 10' 15' 10'      Heel / toe raises    20x          Mini squats in bars             Step ups   6" x10 6" 2x10 6" 20x 6" 20x       Static balance    5' 5' EO/EC 5'        LE circuit  UE 20x Full 20x Full 20x Full 30x Full 30x Full 30x 30x      Sit to stands    2x5                       Lymphedema treatment                          Garment ordering    15'--TA   Garment donning 20'--TA                                                                                                                               Modalities